# Patient Record
Sex: MALE | Race: WHITE | Employment: FULL TIME | URBAN - METROPOLITAN AREA
[De-identification: names, ages, dates, MRNs, and addresses within clinical notes are randomized per-mention and may not be internally consistent; named-entity substitution may affect disease eponyms.]

---

## 2022-04-05 ENCOUNTER — HOSPITAL ENCOUNTER (OUTPATIENT)
Dept: PHYSICAL THERAPY | Age: 27
Discharge: HOME | End: 2022-04-05
Payer: OTHER GOVERNMENT

## 2022-04-05 PROCEDURE — 97110 THERAPEUTIC EXERCISES: CPT

## 2022-04-05 PROCEDURE — 97162 PT EVAL MOD COMPLEX 30 MIN: CPT

## 2022-04-05 NOTE — PROGRESS NOTES
PHYSICAL THERAPY - DAILY TREATMENT NOTE    Patient Name: Kavitha Cheney        Date: 2022  : 1995   YES Patient  Verified  Visit #:      10  Insurance: Payor:  / Plan: Hector Maxwell 74 / Product Type:  /      In time: 10:45 Out time: 11:25   Total Treatment Time: 40     Medicare/[facility] Time Tracking (below)   Total Timed Codes (min):  15 1:1 Treatment Time:  40     TREATMENT AREA =  Right wrist pain [M25.531]    SUBJECTIVE    Pain Level (on 0 to 10 scale):  5  / 10   Medication Changes/New allergies or changes in medical history, any new surgeries or procedures? NO    If yes, update Summary List   Subjective Functional Status/Changes:  []  No changes reported     See POC          OBJECTIVE    10 min Therapeutic Exercise:  [x]  See flow sheet   Rationale:      increase strength to improve the patients ability to load the tendons     5 min Self Care: Reviewed diagnosis, prognosis, therapy progression   Rationale:    Improve understanding of injury and therapy to have realistic expectation of therapy to improve compliance/adherence and satisfaction    Billed With/As:   [] TE   [] TA   [] Neuro   [x] Self Care Patient Education: [x] Review HEP    [] Progressed/Changed HEP based on:   [x] Addressed barriers and behaviors     [] Therapeutic Neuroscience Pain Education, metaphor, reframing, contexts.     [x] Sleep Education   [] Body Mechanics [x] Healing Timeframe     [] Self STM with ball at \"the spot\"  [x] Walking Program/Global Activity   [] other:         Other Objective/Functional Measures:    See POC note     Post Treatment Pain Level (on 0 to 10) scale:   5 / 10     ASSESSMENT  Assessment/Changes in Function:     See POC     []  See Progress Note/Recertification   Patient will continue to benefit from skilled PT services to modify and progress therapeutic interventions, address functional mobility deficits, address ROM deficits, address strength deficits, analyze and address soft tissue restrictions, analyze and cue movement patterns, analyze and modify body mechanics/ergonomics and assess and modify postural abnormalities to attain goals per POC. Progress toward goals / Updated goals:    Pt evaluated today.  See POC     PLAN  [x]  Upgrade activities as tolerated YES Continue plan of care   []  Discharge due to :    []  Other:      Therapist: Fariba Esposito, PT, DPT, OCS    Date: 4/5/2022 Time: 10:44 AM

## 2022-04-05 NOTE — PROGRESS NOTES
[address] [facility] PHYSICAL THERAPY  [address] [place] [address] 51, [address],Arianna Negron, [facility] - Phone: [phone]  Fax: [phone] / 0740 [facility]  Patient Name: Jaqueline Jewell : 1995   Medical   Diagnosis: Right wrist pain [M25.531] Treatment Diagnosis: Right wrist pain [M25.531]   Onset Date: 2022     Referral Source: Jerzy Angulo [facility]): 2022   Prior Hospitalization: See medical history Provider #: [id number]   Prior Level of Function: Indep and pain free   Comorbidities: Tobacco use, back pain, asthma   Medications: Verified on Patient Summary List   The Plan of Care and following information is based on the information from the initial evaluation.   ===========================================================================================  Subjective: Reports mid 2022 slipped on ice and landed on right wrist (noted wrist in ext). Reports got Xray a month and a half later or so and showed no fracture. Reports the top and thumb side hurts. Reports if the hand is at restl it's good. Pt is RHD. Reports doing things w his left hand. Works [address] [employer]. Reports currently working full time, not wearing brace. *Pt reporting he has no hot cold discrimination of the upper back. Assessment / key information:  Pt presents to [facility]. with signs and symptoms congruent with a diagnosis of RIGHT wrist pain with aggravation of the radial extensor tendons after [facility] 2022. This affects his writing, QOL and food prep/cooking ability. Patient would benefit from skilled intervention to address the above deficits, improve quality of life and return patient to maximum level of prior function. OBJECTIVE:   Posture/Positioning: Slight forward head and rounded shoulders. Wrist ROM is full in all planes. Wrist Strength:   Pronation: L: 4+/5,  R: 5/5,   Supination: L: 4+5,  R: 5/5,   Power  Level2:  L: 72 lbs, R: 85 lbs P! Upon release  Key : L: 19 lbs, R: 12 lbs  Pad-Pad pinch: L: 11 lbs, R: 7 lbs    Palpation: TTP to Extensor Pollicis longus tendon at the and ABd pollicis tendon at the proximal scaphoid/distal radius. HEP: \"ow not ow\" intensity finger ext w yellow tband with wirst in neutral, flex, ext and sup/pron with 15 sec holds. CP prn.    ===========================================================================================  Eval Complexity: History MEDIUM  Complexity : 1-2 comorbidities / personal factors will impact the outcome/ POC ;  Examination  MEDIUM Complexity : 3 Standardized tests and measures addressing body structure, function, activity limitation and / or participation in recreation ; Presentation MEDIUM Complexity : Evolving with changing characteristics ; Decision Making MEDIUM Complexity : FOTO score of 26-74; Overall Complexity MEDIUM  Problem List: pain affecting function, decrease strength, decrease ADL/ functional abilitiies and decrease activity tolerance   Treatment Plan may include any combination of the following: Therapeutic exercise, Therapeutic activities, Neuromuscular re-education, Physical agent/modality, Manual therapy, Patient education, Self Care training and Functional mobility training  Patient / Family readiness to learn indicated by: asking questions, trying to perform skills and interest  Persons(s) to be included in education: patient (P)  Barriers to Learning/Limitations: None  Measures taken, if barriers to learning:    Patient Goal (s): \"not be in pain\"   Patient self reported health status: fair  Rehabilitation Potential: good   Short Term Goals:  To be accomplished in  3  weeks  STG1 Pt to report adherence and demonstrate compliance with basic HEP to allow progress between visits  STG2 Pt to report pain <2/10 at worst to allow improved function and QOL   Long Term Goals: To be accomplished in  6  weeks  LTG1 Pt to improve FOTO score to 73/100 indicating improved function in daily tasks  LTG2 Pt to improve strength of pronation / supination: 5/5   LTG3 Pt to improve key pinch  to 18 lbs to indicate improve tendon function    Frequency / Duration:   Patient to be seen  2  times per week for 4  Weeks, then 1x/wk for 2 weeks  Patient / Caregiver education and instruction: self care, activity modification and exercises  Therapist Signature: Boston Vidales PT Date: 4/4/7462   Certification Period: - Time: 10:44 AM   ===========================================================================================  I certify that the above Physical Therapy Services are being furnished while the patient is under my care. I agree with the treatment plan and certify that this therapy is necessary. Physician Signature:        Date:       Time:                                        Agustín Aburto PA-C  Please sign and return to [facility] AT [facility] at [facility] or you may fax the signed copy to [phone]. Thank you.

## 2022-04-07 ENCOUNTER — HOSPITAL ENCOUNTER (OUTPATIENT)
Dept: PHYSICAL THERAPY | Age: 27
Discharge: HOME | End: 2022-04-07
Payer: OTHER GOVERNMENT

## 2022-04-07 PROCEDURE — 97140 MANUAL THERAPY 1/> REGIONS: CPT

## 2022-04-07 PROCEDURE — 97110 THERAPEUTIC EXERCISES: CPT

## 2022-04-07 NOTE — PROGRESS NOTES
PHYSICAL THERAPY - DAILY TREATMENT NOTE    Patient Name: Brett Shaw        Date: 2022  : 1995   yes Patient  Verified  Visit #:   2   of   10  Insurance: Payor: MATTHEW / Plan: Hector Maxwell 74 / Product Type:  /      In time: 3:20 pm Out time: 4:05 pm   Total Treatment Time: 45     Medicare/[employer] Time Tracking (below)   Total Timed Codes (min):  - 1:1 Treatment Time:  -     TREATMENT AREA =  Right wrist pain [M25.531]    SUBJECTIVE  Pain Level (on 0 to 10 scale):  5  / 10   Medication Changes/New allergies or changes in medical history, any new surgeries or procedures?    no  If yes, update Summary List   Subjective Functional Status/Changes:  [x]  No changes reported     Patient reports he had work today and was chopping veggies and had to change his  on a knife to where he was not utilizing his thumb actively. He notes his thenar eminence is very sore due to this  change. He is performing his HEP as instructed and icing minimally. OBJECTIVE  Modalities Rationale:     decrease inflammation and decrease pain to improve patient's ability to  improve the patients ability to perform essential job related tasks.     min [] Estim, type/location:                                      []  att     []  unatt     []  w/US     []  w/ice    []  w/heat    min []  Mechanical Traction: type/lbs                   []  pro   []  sup   []  int   []  cont    []  before manual    []  after manual    min []  Ultrasound, settings/location:      min []  Iontophoresis w/ dexamethasone, location:                                               []  take home patch       []  in clinic   10 min [x]  Ice     []  Heat    location/position: Right wrist    min []  Vasopneumatic Device, press/temp:    If using vaso (only need to measure limb vaso being performed on)      pre-treatment girth :       post-treatment girth :       measured at (landmark location) :      min []  Other:    [x] Skin assessment post-treatment (if applicable):    [x]  intact    [x]  redness- no adverse reaction                  []redness  adverse reaction:      25 min Therapeutic Exercise:  [x]  See flow sheet   Rationale:      increase ROM, increase strength and increase proprioception to improve the patients ability to perform essential job related tasks. 10 min Manual Therapy: CMC mobs, tendon stretches, joint distraction   Rationale:      decrease pain, increase ROM and increase tissue extensibility to improve patient's ability to  improve the patients ability to perform essential job related tasks. The manual therapy interventions were performed at a separate and distinct time from the therapeutic activities interventions. Billed With/As:   [x] TE   [] TA   [] Neuro   [] Self Care Patient Education: [x] Review HEP    [] Progressed/Changed HEP based on:   [] positioning   [] body mechanics   [] transfers   [] heat/ice application    [] other:      Other Objective/Functional Measures:    Patient presents with hypermobility of joints of hand and wrist at baseline. Extensor pollicus longus moderately TTP and slightly hypertrophied. Patient presents trying to \"pop\" joint throughout session. Post Treatment Pain Level (on 0 to 10) scale:   5  / 10     ASSESSMENT  Assessment/Changes in Function:     No changes overall since beginning PT. Icing intermittently and not frequently enough to reduce inflammation and pain. He does report he has been performing his HEP as instructed. []  See Progress Note/Recertification   Patient will continue to benefit from skilled PT services to modify and progress therapeutic interventions, address functional mobility deficits, address ROM deficits, address strength deficits and analyze and address soft tissue restrictions to attain remaining goals. Progress toward goals / Updated goals:    Progressing well towards all goals at this time and they remain applicable to this patient. PLAN  [x]  Upgrade activities as tolerated yes Continue plan of care   []  Discharge due to :    []  Other:      Therapist: Aga Rico PT    Date: 4/7/2022 Time: 1:51 PM     Future Appointments   Date Time Provider [address]   4/7/2022  3:30 PM Druscilla Nickels, PT [facility]   4/11/2022  3:00 PM Druscilla Nickels, PT [facility]   4/14/2022  3:00 PM Druscilla Nickels, PT [facility]   4/18/2022  3:00 PM Druscilla Nickels, PT [facility]   4/21/2022  3:00 PM Druscilla Nickels, PT [facility]   4/25/2022  2:45 PM Druscilla Nickels, PT [facility]   4/28/2022  3:45 PM Druscilla Nickels, PT [facility]   5/2/2022  2:45 PM Druscilla Nickels, PT [facility]   5/9/2022  2:45 PM Druscilla Nickels, [facility]

## 2022-04-11 ENCOUNTER — HOSPITAL ENCOUNTER (OUTPATIENT)
Dept: PHYSICAL THERAPY | Age: 27
Discharge: HOME | End: 2022-04-11
Payer: OTHER GOVERNMENT

## 2022-04-11 PROCEDURE — 97110 THERAPEUTIC EXERCISES: CPT

## 2022-04-11 PROCEDURE — 97140 MANUAL THERAPY 1/> REGIONS: CPT

## 2022-04-11 NOTE — PROGRESS NOTES
PHYSICAL THERAPY - DAILY TREATMENT NOTE    Patient Name: Mary Osorio        Date: 2022  : 1995   yes Patient  Verified  Visit #:   3   of   10  Insurance: Payor: MATTHEW / Plan: Hector Maxwell 74 / Product Type:  /      In time: 3:00 pm Out time: 3:40   Total Treatment Time: 40     Medicare/[employer] Time Tracking (below)   Total Timed Codes (min):  - 1:1 Treatment Time:  -     TREATMENT AREA =  Right wrist pain [M25.531]    SUBJECTIVE  Pain Level (on 0 to 10 scale):  3  / 10   Medication Changes/New allergies or changes in medical history, any new surgeries or procedures?    no  If yes, update Summary List   Subjective Functional Status/Changes:  []  No changes reported     Patient reports experiencing less pain since his last visit. He was able to perform chores and housework over the weekend with little to no pain. He notes work went well today with minimal pain. He feels as if his thumb is finally starting to calm down and he endorses icing at least 3 times a day since his last PT visit. OBJECTIVE  Modalities Rationale:     decrease edema and decrease inflammation to improve patient's functional mobility and ability to perform his work tasks with less pain.    min [] Estim, type/location:                                      []  att     []  unatt     []  w/US     []  w/ice    []  w/heat    min []  Mechanical Traction: type/lbs                   []  pro   []  sup   []  int   []  cont    []  before manual    []  after manual    min []  Ultrasound, settings/location:      min []  Iontophoresis w/ dexamethasone, location:                                               []  take home patch       []  in clinic   10 min [x]  Ice     []  Heat    location/position: Right wrist/Hand anterior/posterior    min []  Vasopneumatic Device, press/temp:    If using vaso (only need to measure limb vaso being performed on)      pre-treatment girth :       post-treatment girth :       measured at (landmark location) :      min []  Other:    [x] Skin assessment post-treatment (if applicable):    [x]  intact    []  redness- no adverse reaction                  []redness  adverse reaction:      15 min Therapeutic Exercise:  [x]  See flow sheet   Rationale:      increase ROM and increase strength to improve the patients ability to perform work tasks to include gripping, handling, fine and gross manipulation. 15 min Manual Therapy:    Rationale:      decrease pain, increase ROM, increase tissue extensibility and decrease trigger points to improve patient's ability to perform work tasks to include gripping, handling, fine and gross manipulation. The manual therapy interventions were performed at a separate and distinct time from the therapeutic activities interventions. Billed With/As:   [] TE   [] TA   [] Neuro   [] Self Care Patient Education: [x] Review HEP    [] Progressed/Changed HEP based on:   [] positioning   [] body mechanics   [] transfers   [] heat/ice application    [] other:      Other Objective/Functional Measures:    Trigger points in thenar eminence are much less this visit; minimal in nature. Abductor pollicis is less taught and CMC joint is close to normal joint mobilty as compared to his opposing thumb on the left. Post Treatment Pain Level (on 0 to 10) scale:   4  / 10     ASSESSMENT  Assessment/Changes in Function:     Patient showing improvements in ROM and functional . He continues to \"pop\" his thumb in a circumduction motion for pressure relief. He is able to hold things with more comfort and complete work and chores with lass pain as well.     []  See Progress Note/Recertification   Patient will continue to benefit from skilled PT services to modify and progress therapeutic interventions, address functional mobility deficits, address ROM deficits, address strength deficits and analyze and address soft tissue restrictions to attain remaining goals.    Progress toward goals / Updated goals:    Patient continues to make progress towards all set goals. Goals remain appropriate.      PLAN  [x]  Upgrade activities as tolerated yes Continue plan of care   []  Discharge due to :    []  Other:      Therapist: Eusebio Ambriz PT    Date: 4/11/2022 Time: 2:58 PM     Future Appointments   Date Time Provider [address]   4/11/2022  3:00 PM Ben Scherer, PT [facility]   4/14/2022  3:00 PM Ben Scherer, PT [facility]   4/18/2022  3:00 PM Ben Scherer, [facility]   4/21/2022  3:00 PM Ben Scherer, PT [facility]   4/25/2022  2:45 PM Ben Scherer, [facility]   4/28/2022  3:45 PM Ben Scherer, [facility]   5/2/2022  2:45 PM Ben Scherer, [facility]   5/9/2022  2:45 PM Ben Scherer, [facility]

## 2022-04-14 ENCOUNTER — HOSPITAL ENCOUNTER (OUTPATIENT)
Dept: PHYSICAL THERAPY | Age: 27
Discharge: HOME | End: 2022-04-14
Payer: OTHER GOVERNMENT

## 2022-04-14 PROCEDURE — 97110 THERAPEUTIC EXERCISES: CPT

## 2022-04-14 PROCEDURE — 97140 MANUAL THERAPY 1/> REGIONS: CPT

## 2022-04-14 NOTE — PROGRESS NOTES
PHYSICAL THERAPY - DAILY TREATMENT NOTE    Patient Name: Skye Lizarraga        Date: 2022  : 1995   yes Patient  Verified  Visit #:      10  Insurance: Payor: MATTHEW / Plan: Hector Maxwell 74 / Product Type:  /      In time: 3:00 pm Out time: 3:50 pm   Total Treatment Time: 50     Medicare/[employer] Time Tracking (below)   Total Timed Codes (min):  - 1:1 Treatment Time:  -     TREATMENT AREA =  Right wrist pain [M25.531]    SUBJECTIVE  Pain Level (on 0 to 10 scale):  3  / 10   Medication Changes/New allergies or changes in medical history, any new surgeries or procedures?    no  If yes, update Summary List   Subjective Functional Status/Changes:  []  No changes reported     Patient reports his pain is more prevalent today. He notes some numbness at his abductor pollicis longus/snuff box area. He denies doing anything at home out of the ordinary and nothing at work was too difficult yesterday. He did endorse not icing the hand at all yesterday, which may not have helped. OBJECTIVE  Modalities Rationale:     decrease inflammation and decrease pain to improve patient's ability to perform functional work tasks.    min [] Estim, type/location:                                      []  att     []  unatt     []  w/US     []  w/ice    []  w/heat    min []  Mechanical Traction: type/lbs                   []  pro   []  sup   []  int   []  cont    []  before manual    []  after manual    min []  Ultrasound, settings/location:      min []  Iontophoresis w/ dexamethasone, location:                                               []  take home patch       []  in clinic   10 min [x]  Ice     []  Heat    location/position: R wrist/thumb wrapped    min []  Vasopneumatic Device, press/temp:    If using vaso (only need to measure limb vaso being performed on)      pre-treatment girth :       post-treatment girth :       measured at (landmark location) :      min []  Other:    [x] Skin assessment post-treatment (if applicable):    [x]  intact    []  redness- no adverse reaction                  []redness  adverse reaction:      25 min Therapeutic Exercise:  [x]  See flow sheet   Rationale:      increase ROM and increase strength to improve the patients ability to perform functional work tasks. 15 min Manual Therapy: STM/DTM to surrounding soft tissue, CMC mobilizations and distraction. Rationale:      decrease pain, increase ROM, increase tissue extensibility and decrease trigger points to improve patient's ability to perform functional work tasks. The manual therapy interventions were performed at a separate and distinct time from the therapeutic activities interventions. Billed With/As:   [x] TE   [] TA   [] Neuro   [] Self Care Patient Education: [x] Review HEP    [] Progressed/Changed HEP based on:   [] positioning   [] body mechanics   [] transfers   [] heat/ice application    [] other:      Other Objective/Functional Measures:    Trigger point noted in thenar eminence.  strength reduced this visit due to inflammation and tendon hypertrophy of flexor pollicis longus and abductor pollicis. Post Treatment Pain Level (on 0 to 10) scale:   4  / 10     ASSESSMENT  Assessment/Changes in Function:   Patient with increased pain, numbness and decreased functional mobility. Patient encouraged to ice and perform HEP more regularly. Patient agreeable and notes he sees a difference when he does not ice. Overall, work is going better and he is able to do more around his home with less pain. []  See Progress Note/Recertification   Patient will continue to benefit from skilled PT services to modify and progress therapeutic interventions, address functional mobility deficits, address ROM deficits, address strength deficits, analyze and address soft tissue restrictions and analyze and modify body mechanics/ergonomics to attain remaining goals.    Progress toward goals / Updated goals:    Patient continues to make progress towards all goals at this time. Goals remain appropriate.      PLAN  [x]  Upgrade activities as tolerated yes Continue plan of care   []  Discharge due to :    []  Other:      Therapist: Yinka Wilcox PT    Date: 4/14/2022 Time: 7:35 AM     Future Appointments   Date Time Provider [address]   4/14/2022  3:00 PM Aleksandr Villatoro, PT [facility]   4/19/2022  2:00 PM Aleksandr Villatoro, PT [facility]   4/21/2022  3:00 PM Aleksandr Villatoro, PT [facility]   4/25/2022  2:45 PM Aleksandr Villatoro, PT [facility]   4/28/2022  3:45 PM Aleksandr Villatoro, PT [facility]   5/2/2022  2:45 PM Aleksandr Villatoro, PT [facility]   5/9/2022  2:45 PM Aleksandr Villatoro, [facility]

## 2022-04-18 ENCOUNTER — APPOINTMENT (OUTPATIENT)
Dept: PHYSICAL THERAPY | Age: 27
End: 2022-04-18
Payer: OTHER GOVERNMENT

## 2022-04-19 ENCOUNTER — HOSPITAL ENCOUNTER (OUTPATIENT)
Dept: PHYSICAL THERAPY | Age: 27
Discharge: HOME | End: 2022-04-19
Payer: OTHER GOVERNMENT

## 2022-04-19 PROCEDURE — 97110 THERAPEUTIC EXERCISES: CPT

## 2022-04-19 PROCEDURE — 97140 MANUAL THERAPY 1/> REGIONS: CPT

## 2022-04-19 NOTE — PROGRESS NOTES
PHYSICAL THERAPY - DAILY TREATMENT NOTE    Patient Name: Jaqueline Jewell        Date: 2022  : 1995   yes Patient  Verified  Visit #:   5   of   10  Insurance: Payor:  / Plan: Hector Maxwell 74 / Product Type:  /      In time: 1:55 pm Out time: 2:50 pm   Total Treatment Time: 55     Medicare/[facility] Time Tracking (below)   Total Timed Codes (min):  - 1:1 Treatment Time:  -     TREATMENT AREA =  Right wrist pain [M25.531]    SUBJECTIVE  Pain Level (on 0 to 10 scale):  2  / 10   Medication Changes/New allergies or changes in medical history, any new surgeries or procedures?    no  If yes, update Summary List   Subjective Functional Status/Changes:  []  No changes reported     Patient reports feeling better overall today. He does endorse that today he is off of work and he has not done much today to aggravate the thumb and wrist. He has found gripping to be improved. OBJECTIVE  Modalities Rationale:     decrease inflammation and decrease pain to improve patient's ability to , hold, lift and carry items.    min [] Estim, type/location:                                      []  att     []  unatt     []  w/US     []  w/ice    []  w/heat    min []  Mechanical Traction: type/lbs                   []  pro   []  sup   []  int   []  cont    []  before manual    []  after manual    min []  Ultrasound, settings/location:      min []  Iontophoresis w/ dexamethasone, location:                                               []  take home patch       []  in clinic   10 min [x]  Ice     []  Heat    location/position: Right wrist ant/post    min []  Vasopneumatic Device, press/temp:    If using vaso (only need to measure limb vaso being performed on)      pre-treatment girth :       post-treatment girth :       measured at (landmark location) :      min []  Other:    [x] Skin assessment post-treatment (if applicable):    [x]  intact    []  redness- no adverse reaction []redness  adverse reaction:      30 min Therapeutic Exercise:  [x]  See flow sheet   Rationale:      increase ROM, increase strength and increase proprioception to improve the patients ability to , hold, lift and carry items. 15 min Manual Therapy: STM to thenar eminence, abductor and flexor pollicis. CMC, and carpal mobilizations. Rationale:      decrease pain, increase ROM, increase tissue extensibility and decrease trigger points to improve patient's ability to , hold, lift and carry items. The manual therapy interventions were performed at a separate and distinct time from the therapeutic activities interventions. Billed With/As:   [x] TE   [] TA   [] Neuro   [] Self Care Patient Education: [x] Review HEP    [] Progressed/Changed HEP based on:   [] positioning   [] body mechanics   [] transfers   [] heat/ice application    [] other:      Other Objective/Functional Measures:    Patient with improved full gripping ROM and less thumb pain. Less crepitus with CMC joint mobs. Post Treatment Pain Level (on 0 to 10) scale:   3  / 10     ASSESSMENT  Assessment/Changes in Function:     Patient tolerated todays visit well. Worked on more functional lifting (26#) and carrying (20#). We discussed other work tasks that he is having difficulty with including cutting/chopping, and stirring large quantities of food in large bowls/vats with wooden dowels. Will continue to imitate work duties for improvements in those tasks specifically and increase his weights up to 50 lbs for lifting/carrying. []  See Progress Note/Recertification   Patient will continue to benefit from skilled PT services to modify and progress therapeutic interventions, address functional mobility deficits, address ROM deficits, address strength deficits and analyze and address soft tissue restrictions to attain remaining goals.    Progress toward goals / Updated goals:    Patient is making good progress towards all functional goals at this time. Pain is steadily decreasing,  ability is close to [facility], he is performing his HEP 1x/day to every other day, and he is icing regularly for good pain relief. All goals remain appropriate at this time and patient's prognosis for rehab is good. He remains motivated for return to [facility].      PLAN  [x]  Upgrade activities as tolerated yes Continue plan of care   []  Discharge due to :    []  Other:      Therapist: Ramon Gonzalez PT    Date: 4/19/2022 Time: 7:47 AM     Future Appointments   Date Time Provider [address]   4/19/2022  2:00 PM Veto Daunt, PT [facility]   4/21/2022  3:00 PM Veto Daunt, PT [facility]   4/25/2022  2:45 PM Veto Daunt, PT [facility]   4/28/2022  3:45 PM Veto Daunt, PT [facility]   5/2/2022  2:45 PM Veto Daunt, PT [facility]   5/9/2022  2:45 PM Veto Daunt, [address] [facility]

## 2022-04-25 ENCOUNTER — HOSPITAL ENCOUNTER (OUTPATIENT)
Dept: PHYSICAL THERAPY | Age: 27
Discharge: HOME | End: 2022-04-25
Payer: OTHER GOVERNMENT

## 2022-04-25 PROCEDURE — 97110 THERAPEUTIC EXERCISES: CPT

## 2022-04-25 PROCEDURE — 97140 MANUAL THERAPY 1/> REGIONS: CPT

## 2022-04-25 NOTE — PROGRESS NOTES
PHYSICAL THERAPY - DAILY TREATMENT NOTE    Patient Name: Jaqueline Jewell        Date: 2022  : 1995   yes Patient  Verified  Visit #:   7   of   10  Insurance: Payor:  / Plan: Hector Maxwell 74 / Product Type:  /      In time: 2:45 pm Out time: 3:45 pm   Total Treatment Time: 60     Medicare/[employer] Time Tracking (below)   Total Timed Codes (min):  - 1:1 Treatment Time:  -     TREATMENT AREA =  Right wrist pain [M25.531]    SUBJECTIVE  Pain Level (on 0 to 10 scale):  2  / 10   Medication Changes/New allergies or changes in medical history, any new surgeries or procedures?    no  If yes, update Summary List   Subjective Functional Status/Changes:  []  No changes reported     Patient reports his day at work was much better today as he was a bit slower today so he reduced his pace which helped his thumb pain. He notes his weekend was much slower as well and he did his stretching HEP and ice regularly. OBJECTIVE  Modalities Rationale:     decrease inflammation and decrease pain to improve patient's ability to , lift and carry items, and perform functional work tasks.    min [] Estim, type/location:                                      []  att     []  unatt     []  w/US     []  w/ice    []  w/heat    min []  Mechanical Traction: type/lbs                   []  pro   []  sup   []  int   []  cont    []  before manual    []  after manual    min []  Ultrasound, settings/location:      min []  Iontophoresis w/ dexamethasone, location:                                               []  take home patch       []  in clinic   10 min [x]  Ice     []  Heat    location/position: Right wrist/thumb ant/post    min []  Vasopneumatic Device, press/temp:    If using vaso (only need to measure limb vaso being performed on)      pre-treatment girth :       post-treatment girth :       measured at (landmark location) :      min []  Other:    [x] Skin assessment post-treatment (if applicable):    [x] intact    []  redness- no adverse reaction                  []redness  adverse reaction:      35 min Therapeutic Exercise:  [x]  See flow sheet   Rationale:      increase ROM and increase strength to improve the patients ability to , lift and carry items, and perform functional work tasks. 15 min Manual Therapy: STM and joint mobs to [facility] and surrounding musculature   Rationale:      decrease pain, increase ROM, increase tissue extensibility and decrease trigger points to improve patient's ability to , lift and carry items, and perform functional work tasks. The manual therapy interventions were performed at a separate and distinct time from the therapeutic activities interventions. Billed With/As:   [x] TE   [] TA   [] Neuro   [] Self Care Patient Education: [x] Review HEP    [] Progressed/Changed HEP based on:   [] positioning   [] body mechanics   [] transfers   [] heat/ice application    [] other:      Other Objective/Functional Measures:    Palpation:       Post Treatment Pain Level (on 0 to 10) scale:   3  / 10     ASSESSMENT  Assessment/Changes in Function:     Patient tolerated todays treatment well. Added/progressed therex as follows: 5# for wrist flex/ext/pro/sup, increased therabar to twisting/wringing motion, cothespin  to blue, and discharged gripper. Discussed increasing HEP compliance at home to add more strengthening versus solely stretching with patient. Patient is agreeable at this time. Will continue to progress as tolerated towards more functional  and lifting patterns as they correlate with work duties.      []  See Progress Note/Recertification   Patient will continue to benefit from skilled PT services to modify and progress therapeutic interventions, address functional mobility deficits, address ROM deficits, address strength deficits, analyze and address soft tissue restrictions, analyze and cue movement patterns and analyze and modify body mechanics/ergonomics to attain remaining goals. Progress toward goals / Updated goals:    Patient continues to make progress towards all goals. Pain is reducing. HEP performance is fair. Strength is improving.  and pinch  are getting better as he is able to chop and cut food at work with less pain.      PLAN  [x]  Upgrade activities as tolerated yes Continue plan of care   []  Discharge due to :    []  Other:      Therapist: Isidro An, PT    Date: 4/25/2022 Time: 7:05 AM     Future Appointments   Date Time Provider [address]   4/25/2022  2:45 PM Anthony Oswald, PT [facility]   4/28/2022  3:45 PM Anthony Oswald, PT [facility]   5/2/2022  2:45 PM Anthony Oswald, PT [facility]   5/9/2022  2:45 PM Anthony Oswald, [address] [facility]

## 2022-04-28 ENCOUNTER — HOSPITAL ENCOUNTER (OUTPATIENT)
Dept: PHYSICAL THERAPY | Age: 27
Discharge: HOME | End: 2022-04-28
Payer: OTHER GOVERNMENT

## 2022-04-28 PROCEDURE — 97140 MANUAL THERAPY 1/> REGIONS: CPT

## 2022-04-28 PROCEDURE — 97110 THERAPEUTIC EXERCISES: CPT

## 2022-04-28 NOTE — PROGRESS NOTES
PHYSICAL THERAPY - DAILY TREATMENT NOTE    Patient Name: Yan Patterson        Date: 2022  : 1995   yes Patient  Verified  Visit #:   8      10  Insurance: Payor:  / Plan: Hector Maxwell 74 / Product Type:  /      In time: 3:40 pm Out time: 4:40 pm   Total Treatment Time: 60     Medicare/[employer] Time Tracking (below)   Total Timed Codes (min):  - 1:1 Treatment Time:  -     TREATMENT AREA =  Right wrist pain [M25.531]    SUBJECTIVE  Pain Level (on 0 to 10 scale):  3  / 10   Medication Changes/New allergies or changes in medical history, any new surgeries or procedures?    no  If yes, update Summary List   Subjective Functional Status/Changes:  []  No changes reported     Patient reports he went grocery shopping today and he had to carry a lot of bags in one trip. When questioned he reports he should not have done them all in one trip.          OBJECTIVE  Modalities Rationale:     decrease inflammation and decrease pain to improve patient's ability to , lift and carry items   min [] Estim, type/location:                                      []  att     []  unatt     []  w/US     []  w/ice    []  w/heat    min []  Mechanical Traction: type/lbs                   []  pro   []  sup   []  int   []  cont    []  before manual    []  after manual    min []  Ultrasound, settings/location:      min []  Iontophoresis w/ dexamethasone, location:                                               []  take home patch       []  in clinic   10 min [x]  Ice     []  Heat    location/position: Right wrist/thumb    min []  Vasopneumatic Device, press/temp:    If using vaso (only need to measure limb vaso being performed on)      pre-treatment girth :       post-treatment girth :       measured at (landmark location) :      min []  Other:    [x] Skin assessment post-treatment (if applicable):    [x]  intact    []  redness- no adverse reaction                  []redness  adverse reaction:      35 min Therapeutic Exercise:  [x]  See flow sheet   Rationale:      increase ROM and increase strength to improve the patients ability to  , lift and carry items     15 min Manual Therapy: CMC mobs and surrounding musculature STM   Rationale:      decrease pain, increase tissue extensibility and decrease trigger points to improve patient's ability to  , lift and carry items  The manual therapy interventions were performed at a separate and distinct time from the therapeutic activities interventions. Billed With/As:   [x] TE   [] TA   [] Neuro   [] Self Care Patient Education: [x] Review HEP    [] Progressed/Changed HEP based on:   [] positioning   [] body mechanics   [] transfers   [] heat/ice application    [] other:      Other Objective/Functional Measures:    Palpation: Minimal TTP to abductor pollicis, and thenar eminence. Joint mobility: less crepitus noted today     Post Treatment Pain Level (on 0 to 10) scale:   3  / 10     ASSESSMENT  Assessment/Changes in Function:     Patient tolerated today's treatment well. Progressed/added therex as follows: 10 reps of box lift and carry, 1 and 2.5# on PVC stir. Discussed strategy with carrying less grocery bags and items at once to limit extremes and extraneous forces through wrist and thumb with patient. Patient is agreeable. Continue to progress as tolerated. []  See Progress Note/Recertification   Patient will continue to benefit from skilled PT services to modify and progress therapeutic interventions, address functional mobility deficits, address ROM deficits, address strength deficits, analyze and address soft tissue restrictions, analyze and cue movement patterns, analyze and modify body mechanics/ergonomics and assess and modify postural abnormalities to attain remaining goals. Progress toward goals / Updated goals:    Patient making good progress towards all goals at this time. HEP performance is compliant. Pain is decreasing.  Wrist strength is improving, as well as  strength. All goals remain applicable.      PLAN  [x]  Upgrade activities as tolerated yes Continue plan of care   []  Discharge due to :    []  Other:      Therapist: Joy Sams PT    Date: 4/28/2022 Time: 3:37 PM     Future Appointments   Date Time Provider [address]   4/28/2022  3:45 PM Tom Rosenberg, PT [facility]   5/2/2022  2:45 PM Tom Rosenberg, PT [facility]   5/9/2022  2:45 PM Tom Rosenberg, [address] [facility]

## 2022-05-02 ENCOUNTER — HOSPITAL ENCOUNTER (OUTPATIENT)
Dept: PHYSICAL THERAPY | Age: 27
Discharge: HOME | End: 2022-05-02
Payer: OTHER GOVERNMENT

## 2022-05-02 PROCEDURE — 97110 THERAPEUTIC EXERCISES: CPT

## 2022-05-02 NOTE — PROGRESS NOTES
PHYSICAL THERAPY - DAILY TREATMENT NOTE    Patient Name: Allen Hyde        Date: 2022  : 1995   yes Patient  Verified  Visit #:   9   of   10  Insurance: Payor: MATTHEW / Plan: Hector Maxwell 74 / Product Type:  /      In time: 2:55 pm Out time: 3:45 pm   Total Treatment Time: 50     Medicare/[employer] Time Tracking (below)   Total Timed Codes (min):  - 1:1 Treatment Time:  -     TREATMENT AREA =  Right wrist pain [M25.531]    SUBJECTIVE  Pain Level (on 0 to 10 scale):  3  / 10   Medication Changes/New allergies or changes in medical history, any new surgeries or procedures?    no  If yes, update Summary List   Subjective Functional Status/Changes:  []  No changes reported     Patient reports he had an easier day at work today and he did not have as much chopping or stirring today. Pain reduced overall. OBJECTIVE  Modalities Rationale:     decrease inflammation and decrease pain to improve patient's ability to , handle, lift and carry items.     min [] Estim, type/location:                                      []  att     []  unatt     []  w/US     []  w/ice    []  w/heat    min []  Mechanical Traction: type/lbs                   []  pro   []  sup   []  int   []  cont    []  before manual    []  after manual    min []  Ultrasound, settings/location:      min []  Iontophoresis w/ dexamethasone, location:                                               []  take home patch       []  in clinic   10 min [x]  Ice     []  Heat    location/position:     min []  Vasopneumatic Device, press/temp:    If using vaso (only need to measure limb vaso being performed on)      pre-treatment girth :       post-treatment girth :       measured at (landmark location) :      min []  Other:    [x] Skin assessment post-treatment (if applicable):    [x]  intact    []  redness- no adverse reaction                  []redness  adverse reaction:      40 min Therapeutic Exercise:  [x]  See flow sheet Rationale:      increase ROM, increase strength and improve coordination to improve the patients ability to , handle, lift and carry items. Billed With/As:   [x] TE   [] TA   [] Neuro   [] Self Care Patient Education: [x] Review HEP    [] Progressed/Changed HEP based on:   [] positioning   [] body mechanics   [] transfers   [] heat/ice application    [] other:      Other Objective/Functional Measures:     ability is much improved for grasping all therapeutic equipment today. Minimal to no TTP at tendonous structures of thumb. Post Treatment Pain Level (on 0 to 10) scale:   2  / 10     ASSESSMENT  Assessment/Changes in Function:     Patient tolerated today's treatment well. Progressed/added therex as follows: pro/sup with power systems plate at 8.2#; stirring want exercises increased to [employer] plate dowel. Discussed continued HEP and stretching with patient  with patient. Patient is agreeable. Continue to progress as tolerated. []  See Progress Note/Recertification   Patient will continue to benefit from skilled PT services to modify and progress therapeutic interventions, address functional mobility deficits, address ROM deficits, address strength deficits, analyze and address soft tissue restrictions, analyze and cue movement patterns and analyze and modify body mechanics/ergonomics to attain remaining goals. Progress toward goals / Updated goals:    Patient making good progress towards all goals at this time. Pain remains low. HEP performance is good. Improved ability to perform work related tasks with less pain. All goals remain applicable.      PLAN  [x]  Upgrade activities as tolerated yes Continue plan of care   []  Discharge due to :    []  Other:      Therapist: Dolores Alejo PT    Date: 5/2/2022 Time: 7:54 AM     Future Appointments   Date Time Provider [address]   5/2/2022  2:45 PM Nisreen Zarate PT [facility]   5/9/2022  2:45 PM Nisreen Zarate PT [facility]

## 2022-05-09 ENCOUNTER — HOSPITAL ENCOUNTER (OUTPATIENT)
Dept: PHYSICAL THERAPY | Age: 27
Discharge: HOME | End: 2022-05-09
Payer: OTHER GOVERNMENT

## 2022-05-09 PROCEDURE — 97530 THERAPEUTIC ACTIVITIES: CPT

## 2022-05-09 PROCEDURE — 97140 MANUAL THERAPY 1/> REGIONS: CPT

## 2022-05-09 PROCEDURE — 97110 THERAPEUTIC EXERCISES: CPT

## 2022-05-09 NOTE — PROGRESS NOTES
PHYSICAL THERAPY - DAILY TREATMENT NOTE    Patient Name: Princess Goldstein        Date: 2022  : 1995   yes Patient  Verified  Visit #:   10   of   10  Insurance: Payor: MATTHEW / Plan: Hector Maxwell 74 / Product Type:  /      In time: 2:45 pm Out time: 3:45 pm   Total Treatment Time: 60     Medicare/[employer] Time Tracking (below)   Total Timed Codes (min):  - 1:1 Treatment Time:  -     TREATMENT AREA =  Right wrist pain [M25.531]    SUBJECTIVE  Pain Level (on 0 to 10 scale):  1  / 10   Medication Changes/New allergies or changes in medical history, any new surgeries or procedures?    no  If yes, update Summary List   Subjective Functional Status/Changes:  []  No changes reported     Patient reports feeling good today. He returns to work from vacation tomorrow and he states his thumb feels minimal pain. OBJECTIVE  Modalities Rationale:     decrease inflammation and decrease pain to improve patient's ability to , stir, chop, and lift/carry items.    min [] Estim, type/location:                                      []  att     []  unatt     []  w/US     []  w/ice    []  w/heat    min []  Mechanical Traction: type/lbs                   []  pro   []  sup   []  int   []  cont    []  before manual    []  after manual    min []  Ultrasound, settings/location:      min []  Iontophoresis w/ dexamethasone, location:                                               []  take home patch       []  in clinic   10 min [x]  Ice     []  Heat    location/position: Right wrist/thumb    min []  Vasopneumatic Device, press/temp:    If using vaso (only need to measure limb vaso being performed on)      pre-treatment girth :       post-treatment girth :       measured at (landmark location) :      min []  Other:    [] Skin assessment post-treatment (if applicable):    []  intact    []  redness- no adverse reaction                  []redness  adverse reaction:      25 min Therapeutic Exercise:  [x]  See flow sheet   Rationale:      increase ROM, increase strength and improve coordination to improve the patients ability to , stir, chop, and lift/carry items. 10 min Manual Therapy: ROM and tendon assessment   Rationale:      increase ROM and increase tissue extensibility to improve patient's ability to , stir, chop, and lift/carry items. The manual therapy interventions were performed at a separate and distinct time from the therapeutic activities interventions. 15 min Therapeutic Activity: [x]  See flow sheet   Rationale:    increase ROM, increase strength and improve coordination to improve the patients ability to , stir, chop, and lift/carry items. Billed With/As:   [x] TE   [] TA   [] Neuro   [] Self Care Patient Education: [x] Review HEP    [] Progressed/Changed HEP based on:   [] positioning   [] body mechanics   [] transfers   [] heat/ice application    [] other:      Other Objective/Functional Measures:    Palpation: No TTP noted today; trigger point noted in thenar eminence. ROM: Wrist WNL; Thumb WNL  Strength: 5/5 Wrist and thumb   Strength: Power  level 2 -  Right  90 lbs  /Pinch strength: Right Pad to pad pinch 11 lbs; key  16 kbs     Post Treatment Pain Level (on 0 to 10) scale:   0  / 10     ASSESSMENT  Assessment/Changes in Function:     Patient tolerated today's treatment well. Progressed/added therex as follows: no progressions due to assessment of progress to determine discharge to independent HEP. Discussed findings with patient. Patient is agreeable. Will d/c today. []  See Progress Note/Recertification   Patient will be discharged from formal PT due to reaching goals. Progress toward goals / Updated goals:    Patient making excellent progress towards all goals at this time. Patient has achieved goals at time of d/c. See discharge note for assessment of progress.      PLAN  []  Upgrade activities as tolerated No Continue plan of care   [x]  Discharge due to : Goals achieved. Returned to [facility].    []  Other:      Therapist: Rudolph Leon, PT    Date: 5/9/2022 Time: 7:04 AM     Future Appointments   Date Time Provider [address]   5/9/2022  2:45 PM Bandar [facility]

## 2022-05-09 NOTE — PROGRESS NOTES
[address] PHYSICAL THERAPY  [address] [place] [address], [address], [place], [address] [facility] - Phone: [phone]  Fax: [phone] SUMMARY  Patient Name: Bo Chavez : 1995   Treatment/Medical Diagnosis: Right wrist pain [M25.531]   Referral Source: Carlene Menezes PA-C     Date of Initial Visit: 22 Attended Visits: 10/10 Missed Visits: 0     SUMMARY OF TREATMENT  Primarily focused on rehab of right wrist pain and pollicis tendon tendinitis s/p a [address] in 2022. 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 Began with ROM, stretching within tolerable limits and isometric strengthening and progressed to more AROM and vigorous stretching as tolerated to improve tendon function and overall functional mobility of the hand and wrist. Focused on grib strength in power positions, pinch  and pad-to-pad gripping exercises for mimicking work-related tasks and activities. Patient was able to perform >50# box lift and carry with strong power  and able to farmers carry a 20# KB for community distances and for >5 minutes for improved endurance. Patient education was provided regarding anatomy and diagnosis of wrist pain and tendinitis, ice versus heat, relative rest on days off from work, compliance with HEP and attendance to PT for good outcomes. Patient is very pleased with the care he received and is ready for d/c today. CURRENT STATUS  Mr. Dwight Deutsch was a pleasure to have in PT. He was very motivated to reduce his wrist and thumb pain and return to work as normal. His wrist and all phalanx ROM is WNL, strength is WNL and  has improved greatly. Palpation: No TTP noted today; trigger point noted in thenar eminence that is nonpainful.   strength was assessed today and found to be: Power  level 2 -  Right  90 lbs with no pain (improved from 85 lbs with pain upon release). Pinch strength: Right Pad-to-pad pinch 11 lbs (improved from 7 lbs); and key  16 lbs (improved from 12 lbs). Patient has been working full duty and his pain has decreased overall and his functional mobility has returned to where he no longer uses compensatory strategies for cutting and chopping food. He present today with minimal to no pain and due to attaining almost all of his goals he is ready for a transition to an independent HEP and formal discharge from PT. Patient is agreeable to this plan.           Goal/Measure of Progress - STGs Goal Met? 1. STG1 Pt to report adherence and demonstrate compliance with basic HEP to allow progress between visits   Status at last Eval: Goal Established Current Status: Good compliance noted yes   2. STG2 Pt to report pain <2/10 at worst to allow improved function and QOL   Status at last Eval: Goal Established; 8/10 at eval Current Status: 1/10 today yes     Goal/Measure of Progress - LTGs Goal Met? 1. LTG1 Pt to improve FOTO score to 73/100 indicating improved function in daily tasks   Status at last Eval: Goal established; 57/100 Current Status: 77/100 yes   2. LTG2 Pt to improve strength of pronation / supination: 5/5    Status at last Eval: Goal Established; 4+/5 Current Status: 5/5 yes   3. LTG3 Pt to improve key pinch  to 18 lbs to indicate improve tendon function   Status at last Eval: Goal established; 12 lbs Current Status: 16 lbs Progressing       RECOMMENDATIONS  Discontinue therapy. Progressing towards or have reached established goals. If you have any questions/comments please contact us directly at [phone]. Thank you for allowing us to assist in the care of your patient.     Therapist Signature: Yanni Sung PT Date: 5/9/22     Time: 4:17 PM